# Patient Record
Sex: FEMALE | Race: WHITE | URBAN - METROPOLITAN AREA
[De-identification: names, ages, dates, MRNs, and addresses within clinical notes are randomized per-mention and may not be internally consistent; named-entity substitution may affect disease eponyms.]

---

## 2023-09-18 SDOH — HEALTH STABILITY: PHYSICAL HEALTH: ON AVERAGE, HOW MANY MINUTES DO YOU ENGAGE IN EXERCISE AT THIS LEVEL?: 20 MIN

## 2023-09-18 SDOH — HEALTH STABILITY: PHYSICAL HEALTH: ON AVERAGE, HOW MANY DAYS PER WEEK DO YOU ENGAGE IN MODERATE TO STRENUOUS EXERCISE (LIKE A BRISK WALK)?: 2 DAYS

## 2023-09-19 ENCOUNTER — OFFICE VISIT (OUTPATIENT)
Age: 53
End: 2023-09-19

## 2023-09-19 VITALS — HEIGHT: 67 IN | WEIGHT: 200 LBS | BODY MASS INDEX: 31.39 KG/M2

## 2023-09-19 DIAGNOSIS — M79.672 LEFT FOOT PAIN: ICD-10-CM

## 2023-09-19 DIAGNOSIS — M19.072 PRIMARY OSTEOARTHRITIS OF LEFT FOOT: Primary | ICD-10-CM

## 2023-09-19 DIAGNOSIS — M19.072 PRIMARY OSTEOARTHRITIS OF LEFT FOOT: ICD-10-CM

## 2023-09-19 NOTE — PROGRESS NOTES
[facility]       DIAGNOSTIC IMAGING      Orders Placed This Encounter   Procedures    [29720] Foot Min 3V     Order Specific Question:   Are you Pregnant? Answer:   No      FOOT X RAYS 3 VIEWS LEFT  9/19/2023    LEFT FOOT X-rays, NON WEIGHT BEARING 3 views, AP/LAT/OBL completed 9/19/2023 AT [facility]     X-rays reveal Osseous: No fractures or dislocations. No focal osteolytic or osteoblastic process. Bone Spurs: No significant bone spurs. Overall alignment is  acceptable. Soft tissue swelling is not noted, No radiopaque foreign body and No abnormal calcific densities to soft tissues. No osteolytic or osteoblastic lesions noted, no projection x-ray images. Mineralization suggests no osteopenia. Degenerative changes/Joint Condition: There is significant degenerative changes to the left midfoot, left second, third, fourth TMT joint articulations specifically. 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 Calcified vessels are not present.      I have personally reviewed the results of the above study and the interpretation of this study is my professional opinion   Bishop Davey MD  9/19/2023  8:58 AM
HPI.          I have spent 30  minutes reviewing the previous notes, reviewing diagnostic studies [Advanced  Imaging, Diagnostic test results (x-rays)] and had a direct face to face with the patient discussing the diagnosis and importance of compliance with the treatment and plan. The treatment plan is listed in the above plan section of this Office encounter. I  answered all of her questions, as well as documenting patient care coordination for this individual on the day of the visit. Disclaimer:     Sections of this note are dictated using utilizing voice recognition software, which may have resulted in some phonetic based errors in grammar and contents. Even though attempts were made to correct all the mistakes, some may have been missed, and remained in the body of the document. If questions arise, please contact our department. An electronic signature was used to authenticate this note. Eugenia Samuel may have a reminder for a \"due or due soon\" health maintenance. I have asked that she contact her primary care provider for follow-up on this health maintenance. Documentation by farzana Veronica, as dictated by Cassandra Renae. Yvette De La Torre MD on 9/19/2023.

## 2023-10-03 ENCOUNTER — TELEPHONE (OUTPATIENT)
Age: 53
End: 2023-10-03

## 2023-10-03 NOTE — TELEPHONE ENCOUNTER
Dr. Ansari Last office is requesting the patient last office note to be faxed over         Phone  [phone]      Fax  [phone]